# Patient Record
(demographics unavailable — no encounter records)

---

## 2025-01-27 NOTE — HISTORY OF PRESENT ILLNESS
[de-identified] : Cough, congestion, fever x several days. Tmax of 100.8F. [FreeTextEntry6] : sore throat, stomachache no vomiting, no diarrhea normal appetite  brother had Strep last week

## 2025-01-27 NOTE — PLAN
[TextEntry] : symptomatic treatment fluids intake handwashing and infection control discussed if throat culture positive, start amoxicillin 400/5 6.25mL BID x10 days recheck if worse/not improving

## 2025-03-27 NOTE — REVIEW OF SYSTEMS
[Fever] : fever [Nasal Congestion] : nasal congestion [Cough] : cough [Abdominal Pain] : abdominal pain [Negative] : Genitourinary

## 2025-03-27 NOTE — DISCUSSION/SUMMARY
[FreeTextEntry1] : Discussed positive flu results and possible complications of flu to be aware of Discussed risks/benefits of Tamiflu - parent wants to defer Stat strep test ordered Throat culture, if POSITIVE, give Amoxicillin 400/5 1 tsp BID x 10 days Symptomatic treatment Maintain adequate hydration  Cool mist humidifier Saline nose drops and bulb suctioning as needed Stressed handwashing and infection control  Pay close observation for new or worsening symptoms Instructed to return to office if symptoms worsen/persist or fevers persist Go to ER or UC if condition worsens or unable to to get to the office or after office hours

## 2025-03-27 NOTE — HISTORY OF PRESENT ILLNESS
[de-identified] : fever and stomach pain  [FreeTextEntry6] : Fever x last night  c/o stomach ache x 1 day No Sore throat Slight nasal congestion and congestion x 1 day No chest pain or SOB No vomiting, no diarrhea appetite decreased, + fluids normal UOP No known covid contacts